# Patient Record
Sex: FEMALE | Race: WHITE | URBAN - METROPOLITAN AREA
[De-identification: names, ages, dates, MRNs, and addresses within clinical notes are randomized per-mention and may not be internally consistent; named-entity substitution may affect disease eponyms.]

---

## 2017-01-23 ENCOUNTER — HISTORICAL (OUTPATIENT)
Dept: LAB | Facility: HOSPITAL | Age: 82
End: 2017-01-23

## 2017-04-25 ENCOUNTER — HISTORICAL (OUTPATIENT)
Dept: LAB | Facility: HOSPITAL | Age: 82
End: 2017-04-25

## 2017-04-27 ENCOUNTER — HISTORICAL (OUTPATIENT)
Dept: MEDSURG UNIT | Facility: HOSPITAL | Age: 82
End: 2017-04-27

## 2017-05-04 ENCOUNTER — HISTORICAL (OUTPATIENT)
Dept: ADMINISTRATIVE | Facility: HOSPITAL | Age: 82
End: 2017-05-04

## 2017-05-04 LAB
INR PPP: 1.95
PROTHROMBIN TIME: 19.8 SECOND(S) (ref 9–11.5)

## 2017-05-11 ENCOUNTER — HISTORICAL (OUTPATIENT)
Dept: ADMINISTRATIVE | Facility: HOSPITAL | Age: 82
End: 2017-05-11

## 2017-05-11 LAB
INR PPP: 2.31
PROTHROMBIN TIME: 23.5 SECOND(S) (ref 9–11.5)

## 2017-05-18 ENCOUNTER — HISTORICAL (OUTPATIENT)
Dept: ADMINISTRATIVE | Facility: HOSPITAL | Age: 82
End: 2017-05-18

## 2017-05-18 LAB
INR PPP: 2.71
PROTHROMBIN TIME: 27.7 SECOND(S) (ref 9–11.5)

## 2017-06-01 ENCOUNTER — HISTORICAL (OUTPATIENT)
Dept: ADMINISTRATIVE | Facility: HOSPITAL | Age: 82
End: 2017-06-01

## 2017-06-01 LAB
INR PPP: 2.57
PROTHROMBIN TIME: 26.2 SECOND(S) (ref 9–11.5)

## 2017-06-08 ENCOUNTER — HISTORICAL (OUTPATIENT)
Dept: ADMINISTRATIVE | Facility: HOSPITAL | Age: 82
End: 2017-06-08

## 2017-06-08 LAB
INR PPP: 2.5
PROTHROMBIN TIME: 25.5 SECOND(S) (ref 9–11.5)

## 2017-06-21 ENCOUNTER — HISTORICAL (OUTPATIENT)
Dept: ADMINISTRATIVE | Facility: HOSPITAL | Age: 82
End: 2017-06-21

## 2017-06-21 LAB
INR PPP: 2.18
PROTHROMBIN TIME: 22.2 SECOND(S) (ref 9–11.5)

## 2017-07-03 ENCOUNTER — HISTORICAL (OUTPATIENT)
Dept: ADMINISTRATIVE | Facility: HOSPITAL | Age: 82
End: 2017-07-03

## 2017-07-03 LAB — DIGOXIN SERPL-MCNC: 0.87 NG/ML (ref 0.9–2)

## 2017-07-19 ENCOUNTER — HISTORICAL (OUTPATIENT)
Dept: ADMINISTRATIVE | Facility: HOSPITAL | Age: 82
End: 2017-07-19

## 2017-07-19 LAB
INR PPP: 2.95
PROTHROMBIN TIME: 30.1 SECOND(S) (ref 9–11.5)

## 2017-08-18 ENCOUNTER — HISTORICAL (OUTPATIENT)
Dept: ADMINISTRATIVE | Facility: HOSPITAL | Age: 82
End: 2017-08-18

## 2017-08-18 LAB
ABS NEUT (OLG): 1.97
ALBUMIN SERPL-MCNC: 3.1 GM/DL (ref 3.4–5)
ALBUMIN/GLOB SERPL: 0.9 RATIO (ref 1.1–2)
ALP SERPL-CCNC: 69 UNIT/L (ref 50–136)
ALT SERPL-CCNC: 30 UNIT/L (ref 12–78)
AST SERPL-CCNC: 35 UNIT/L (ref 10–37)
BASOPHILS # BLD AUTO: 0.01 X10(3)/MCL
BASOPHILS NFR BLD AUTO: 0.3 %
BILIRUB SERPL-MCNC: 0.6 MG/DL (ref 0.2–1)
BILIRUBIN DIRECT+TOT PNL SERPL-MCNC: 0.15 MG/DL (ref 0.05–0.2)
BILIRUBIN DIRECT+TOT PNL SERPL-MCNC: 0.45 MG/DL
BUN SERPL-MCNC: 18 MG/DL (ref 7–18)
CALCIUM SERPL-MCNC: 8.5 MG/DL (ref 8.5–10.1)
CHLORIDE SERPL-SCNC: 105 MMOL/L (ref 98–107)
CHOLEST SERPL-MCNC: 178 MG/DL (ref 50–200)
CHOLEST/HDLC SERPL: 3 {RATIO} (ref 0–5)
CO2 SERPL-SCNC: 30.9 MMOL/L (ref 21–32)
CREAT SERPL-MCNC: 0.66 MG/DL (ref 0.55–1.02)
EOSINOPHIL # BLD AUTO: 0.03 X10(3)/MCL
EOSINOPHIL NFR BLD AUTO: 0.8 %
ERYTHROCYTE [DISTWIDTH] IN BLOOD BY AUTOMATED COUNT: 16 %
GLOBULIN SER-MCNC: 3.4 GM/DL (ref 2.4–3.5)
GLUCOSE SERPL-MCNC: 83 MG/DL (ref 74–106)
HCT VFR BLD AUTO: 41 % (ref 34–46)
HDLC SERPL-MCNC: 59 MG/DL (ref 35–60)
HGB BLD-MCNC: 13.1 GM/DL (ref 11.3–15.4)
IMM GRANULOCYTES # BLD AUTO: 0 10*3/UL (ref 0–0.1)
IMM GRANULOCYTES NFR BLD AUTO: 0 % (ref 0–1)
INR PPP: 2.55
LDLC SERPL CALC-MCNC: 108 MG/DL (ref 50–140)
LYMPHOCYTES # BLD AUTO: 1.4 X10(3)/MCL
LYMPHOCYTES NFR BLD AUTO: 35.2 %
MCH RBC QN AUTO: 30.3 PG (ref 27–33)
MCHC RBC AUTO-ENTMCNC: 32 GM/DL (ref 32–35)
MCV RBC AUTO: 94.7 FL (ref 81–97)
MONOCYTES # BLD AUTO: 0.57 X10(3)/MCL
MONOCYTES NFR BLD AUTO: 14.3 %
NEUTROPHILS # BLD AUTO: 1.97 X10(3)/MCL
NEUTROPHILS NFR BLD AUTO: 49.4 %
PLATELET # BLD AUTO: 115 X10(3)/MCL (ref 151–368)
PMV BLD AUTO: 11 FL
POTASSIUM SERPL-SCNC: 4.5 MMOL/L (ref 3.5–5.1)
PROT SERPL-MCNC: 6.5 GM/DL (ref 6.4–8.2)
PROTHROMBIN TIME: 26 SECOND(S) (ref 9–11.5)
RBC # BLD AUTO: 4.33 X10(6)/MCL (ref 3.9–5)
SODIUM SERPL-SCNC: 142 MMOL/L (ref 136–145)
TRIGL SERPL-MCNC: 53 MG/DL (ref 30–150)
VLDLC SERPL CALC-MCNC: 11 MG/DL
WBC # SPEC AUTO: 3.98 X10(3)/MCL (ref 3.4–9.2)

## 2017-08-22 ENCOUNTER — HISTORICAL (OUTPATIENT)
Dept: ADMINISTRATIVE | Facility: HOSPITAL | Age: 82
End: 2017-08-22

## 2017-08-22 LAB
ABS NEUT (OLG): 2.04
ALBUMIN SERPL-MCNC: 3.3 GM/DL (ref 3.4–5)
ALBUMIN/GLOB SERPL: 0.9 RATIO (ref 1.1–2)
ALP SERPL-CCNC: 72 UNIT/L (ref 50–136)
ALT SERPL-CCNC: 21 UNIT/L (ref 12–78)
AST SERPL-CCNC: 30 UNIT/L (ref 10–37)
BASOPHILS # BLD AUTO: 0.01 X10(3)/MCL
BASOPHILS NFR BLD AUTO: 0.2 %
BILIRUB SERPL-MCNC: 0.6 MG/DL (ref 0.2–1)
BILIRUBIN DIRECT+TOT PNL SERPL-MCNC: 0.2 MG/DL (ref 0.05–0.2)
BILIRUBIN DIRECT+TOT PNL SERPL-MCNC: 0.4 MG/DL
BUN SERPL-MCNC: 22 MG/DL (ref 7–18)
CALCIUM SERPL-MCNC: 8.7 MG/DL (ref 8.5–10.1)
CHLORIDE SERPL-SCNC: 106 MMOL/L (ref 98–107)
CHOLEST SERPL-MCNC: 194 MG/DL (ref 50–200)
CHOLEST/HDLC SERPL: 3 {RATIO} (ref 0–5)
CO2 SERPL-SCNC: 28.9 MMOL/L (ref 21–32)
CREAT SERPL-MCNC: 0.62 MG/DL (ref 0.55–1.02)
DIGOXIN SERPL-MCNC: 1.48 NG/ML (ref 0.9–2)
EOSINOPHIL # BLD AUTO: 0.04 X10(3)/MCL
EOSINOPHIL NFR BLD AUTO: 1 %
ERYTHROCYTE [DISTWIDTH] IN BLOOD BY AUTOMATED COUNT: 16 %
GLOBULIN SER-MCNC: 3.7 GM/DL (ref 2.4–3.5)
GLUCOSE SERPL-MCNC: 80 MG/DL (ref 74–106)
HCT VFR BLD AUTO: 43.9 % (ref 34–46)
HDLC SERPL-MCNC: 65 MG/DL (ref 35–60)
HGB BLD-MCNC: 14 GM/DL (ref 11.3–15.4)
IMM GRANULOCYTES # BLD AUTO: 0.01 10*3/UL (ref 0–0.1)
IMM GRANULOCYTES NFR BLD AUTO: 0.2 % (ref 0–1)
LDLC SERPL CALC-MCNC: 119 MG/DL (ref 50–140)
LYMPHOCYTES # BLD AUTO: 1.47 X10(3)/MCL
LYMPHOCYTES NFR BLD AUTO: 35.9 %
MCH RBC QN AUTO: 30.4 PG (ref 27–33)
MCHC RBC AUTO-ENTMCNC: 31.9 GM/DL (ref 32–35)
MCV RBC AUTO: 95.2 FL (ref 81–97)
MONOCYTES # BLD AUTO: 0.52 X10(3)/MCL
MONOCYTES NFR BLD AUTO: 12.7 %
NEUTROPHILS # BLD AUTO: 2.04 X10(3)/MCL
NEUTROPHILS NFR BLD AUTO: 50 %
PLATELET # BLD AUTO: 136 X10(3)/MCL (ref 151–368)
PMV BLD AUTO: 12 FL
POTASSIUM SERPL-SCNC: 4.3 MMOL/L (ref 3.5–5.1)
PROT SERPL-MCNC: 7 GM/DL (ref 6.4–8.2)
RBC # BLD AUTO: 4.61 X10(6)/MCL (ref 3.9–5)
SODIUM SERPL-SCNC: 143 MMOL/L (ref 136–145)
TRIGL SERPL-MCNC: 50 MG/DL (ref 30–150)
VLDLC SERPL CALC-MCNC: 10 MG/DL
WBC # SPEC AUTO: 4.09 X10(3)/MCL (ref 3.4–9.2)

## 2017-09-14 ENCOUNTER — HISTORICAL (OUTPATIENT)
Dept: ADMINISTRATIVE | Facility: HOSPITAL | Age: 82
End: 2017-09-14

## 2017-09-14 LAB
INR PPP: 2.59
PROTHROMBIN TIME: 26.6 SECOND(S) (ref 9–11.5)

## 2017-09-19 ENCOUNTER — HISTORICAL (OUTPATIENT)
Dept: ADMINISTRATIVE | Facility: HOSPITAL | Age: 82
End: 2017-09-19

## 2017-09-19 LAB — DIGOXIN SERPL-MCNC: 0.71 NG/ML (ref 0.9–2)

## 2017-10-12 ENCOUNTER — HISTORICAL (OUTPATIENT)
Dept: ADMINISTRATIVE | Facility: HOSPITAL | Age: 82
End: 2017-10-12

## 2017-10-12 LAB
INR PPP: 2.2
PROTHROMBIN TIME: 22.6 SECOND(S) (ref 9–11.5)

## 2017-11-21 ENCOUNTER — HISTORICAL (OUTPATIENT)
Dept: ADMINISTRATIVE | Facility: HOSPITAL | Age: 82
End: 2017-11-21

## 2017-11-21 LAB
INR PPP: 3.99
PROTHROMBIN TIME: 40.3 SECOND(S) (ref 9–11.5)

## 2017-12-19 ENCOUNTER — HISTORICAL (OUTPATIENT)
Dept: ADMINISTRATIVE | Facility: HOSPITAL | Age: 82
End: 2017-12-19

## 2017-12-19 LAB
DIGOXIN SERPL-MCNC: 1.09 NG/ML (ref 0.9–2)
INR PPP: 3.81
PROTHROMBIN TIME: 38.5 SECOND(S) (ref 9–11.5)

## 2017-12-22 ENCOUNTER — HISTORICAL (OUTPATIENT)
Dept: ADMINISTRATIVE | Facility: HOSPITAL | Age: 82
End: 2017-12-22

## 2017-12-22 LAB
INR PPP: 2.56
PROTHROMBIN TIME: 25.9 SECOND(S) (ref 9–11.5)

## 2018-01-18 ENCOUNTER — HISTORICAL (OUTPATIENT)
Dept: ADMINISTRATIVE | Facility: HOSPITAL | Age: 83
End: 2018-01-18

## 2018-01-18 LAB
INR PPP: 5.13
PROTHROMBIN TIME: 51.8 SECOND(S) (ref 9–11.5)

## 2018-01-19 ENCOUNTER — HISTORICAL (OUTPATIENT)
Dept: ADMINISTRATIVE | Facility: HOSPITAL | Age: 83
End: 2018-01-19

## 2018-01-19 LAB
INR PPP: 1.81
PROTHROMBIN TIME: 18.3 SECOND(S) (ref 9–11.5)

## 2018-01-22 ENCOUNTER — HISTORICAL (OUTPATIENT)
Dept: ADMINISTRATIVE | Facility: HOSPITAL | Age: 83
End: 2018-01-22

## 2018-01-22 LAB
INR PPP: 1.15
PROTHROMBIN TIME: 11.6 SECOND(S) (ref 9–11.5)

## 2018-01-29 ENCOUNTER — HISTORICAL (OUTPATIENT)
Dept: ADMINISTRATIVE | Facility: HOSPITAL | Age: 83
End: 2018-01-29

## 2018-01-29 LAB
INR PPP: 2.25
PROTHROMBIN TIME: 22.7 SECOND(S) (ref 9–11.5)

## 2018-02-05 ENCOUNTER — HISTORICAL (OUTPATIENT)
Dept: ADMINISTRATIVE | Facility: HOSPITAL | Age: 83
End: 2018-02-05

## 2018-02-05 LAB
INR PPP: 2.37
PROTHROMBIN TIME: 23.9 SECOND(S) (ref 9–11.5)

## 2018-02-19 ENCOUNTER — HISTORICAL (OUTPATIENT)
Dept: ADMINISTRATIVE | Facility: HOSPITAL | Age: 83
End: 2018-02-19

## 2018-02-19 LAB
INR PPP: 2.87
PROTHROMBIN TIME: 29 SECOND(S) (ref 9–11.5)

## 2018-03-05 ENCOUNTER — HISTORICAL (OUTPATIENT)
Dept: ADMINISTRATIVE | Facility: HOSPITAL | Age: 83
End: 2018-03-05

## 2018-03-05 LAB
INR PPP: 3.67
PROTHROMBIN TIME: 37.1 SECOND(S) (ref 9–11.5)

## 2018-03-12 ENCOUNTER — HISTORICAL (OUTPATIENT)
Dept: ADMINISTRATIVE | Facility: HOSPITAL | Age: 83
End: 2018-03-12

## 2018-03-12 LAB
INR PPP: 1.8
PROTHROMBIN TIME: 18.2 SECOND(S) (ref 9–11.5)

## 2018-03-19 ENCOUNTER — HISTORICAL (OUTPATIENT)
Dept: ADMINISTRATIVE | Facility: HOSPITAL | Age: 83
End: 2018-03-19

## 2018-03-19 LAB
INR PPP: 1.37
PROTHROMBIN TIME: 13.8 SECOND(S) (ref 9–11.5)

## 2018-03-20 ENCOUNTER — HISTORICAL (OUTPATIENT)
Dept: ADMINISTRATIVE | Facility: HOSPITAL | Age: 83
End: 2018-03-20

## 2018-03-20 LAB — DIGOXIN SERPL-MCNC: 0.62 NG/ML (ref 0.9–2)

## 2018-03-26 ENCOUNTER — HISTORICAL (OUTPATIENT)
Dept: ADMINISTRATIVE | Facility: HOSPITAL | Age: 83
End: 2018-03-26

## 2018-03-26 LAB
INR PPP: 2.28
PROTHROMBIN TIME: 23 SECOND(S) (ref 9–11.5)

## 2018-04-21 ENCOUNTER — HISTORICAL (OUTPATIENT)
Dept: ADMINISTRATIVE | Facility: HOSPITAL | Age: 83
End: 2018-04-21

## 2018-04-21 LAB
APPEARANCE, UA: CLEAR
BACTERIA SPEC CULT: ABNORMAL
BILIRUB UR QL STRIP: NEGATIVE
COLOR UR: YELLOW
GLUCOSE (UA): NEGATIVE
HGB UR QL STRIP: ABNORMAL
KETONES UR QL STRIP: ABNORMAL
LEUKOCYTE ESTERASE UR QL STRIP: NEGATIVE
NITRITE UR QL STRIP: NEGATIVE
PH UR STRIP: 6 [PH] (ref 4.6–8)
PROT UR QL STRIP: NEGATIVE
RBC #/AREA URNS HPF: ABNORMAL /[HPF]
SP GR UR STRIP: 1.01 (ref 1–1.03)
SQUAMOUS EPITHELIAL, UA: ABNORMAL
UROBILINOGEN UR STRIP-ACNC: 1
WBC #/AREA URNS HPF: ABNORMAL /HPF

## 2018-04-24 ENCOUNTER — HISTORICAL (OUTPATIENT)
Dept: ADMINISTRATIVE | Facility: HOSPITAL | Age: 83
End: 2018-04-24

## 2018-04-24 ENCOUNTER — HOSPITAL ENCOUNTER (OUTPATIENT)
Dept: MEDSURG UNIT | Facility: HOSPITAL | Age: 83
End: 2018-04-26
Attending: FAMILY MEDICINE | Admitting: FAMILY MEDICINE

## 2018-04-24 LAB
FINAL CULTURE: NO GROWTH
INR PPP: 3.8
PROTHROMBIN TIME: 38.6 SECOND(S) (ref 9.3–11.4)

## 2018-04-25 LAB
INR PPP: 3
PROTHROMBIN TIME: 30.6 SECOND(S) (ref 9.3–11.4)

## 2018-04-26 LAB
INR PPP: 1.9
PROTHROMBIN TIME: 19 SECOND(S) (ref 9.3–11.4)

## 2018-05-03 ENCOUNTER — HISTORICAL (OUTPATIENT)
Dept: ADMINISTRATIVE | Facility: HOSPITAL | Age: 83
End: 2018-05-03

## 2018-05-03 LAB
INR PPP: 2.1
PROTHROMBIN TIME: 21.3 SECOND(S) (ref 9.3–11.4)

## 2018-05-07 ENCOUNTER — HISTORICAL (OUTPATIENT)
Dept: RADIOLOGY | Facility: HOSPITAL | Age: 83
End: 2018-05-07

## 2018-05-15 ENCOUNTER — HISTORICAL (OUTPATIENT)
Dept: ADMINISTRATIVE | Facility: HOSPITAL | Age: 83
End: 2018-05-15

## 2018-05-15 LAB
INR PPP: 2.8
PROTHROMBIN TIME: 28.8 SECOND(S) (ref 9.3–11.4)

## 2018-06-04 ENCOUNTER — HISTORICAL (OUTPATIENT)
Dept: ADMINISTRATIVE | Facility: HOSPITAL | Age: 83
End: 2018-06-04

## 2018-06-04 LAB
INR PPP: 2.8
PROTHROMBIN TIME: 28.6 SECOND(S) (ref 9.3–11.4)

## 2018-06-12 ENCOUNTER — HISTORICAL (OUTPATIENT)
Dept: ADMINISTRATIVE | Facility: HOSPITAL | Age: 83
End: 2018-06-12

## 2018-06-12 LAB — DIGOXIN SERPL-MCNC: 1.46 NG/ML (ref 0.9–2)

## 2018-06-15 ENCOUNTER — HISTORICAL (OUTPATIENT)
Dept: ADMINISTRATIVE | Facility: HOSPITAL | Age: 83
End: 2018-06-15

## 2018-06-15 LAB
INR PPP: 2.9
PROTHROMBIN TIME: 29.4 SECOND(S) (ref 9.3–11.4)

## 2018-07-16 ENCOUNTER — HISTORICAL (OUTPATIENT)
Dept: ADMINISTRATIVE | Facility: HOSPITAL | Age: 83
End: 2018-07-16

## 2018-07-16 LAB
INR PPP: 2.5
PROTHROMBIN TIME: 25.2 SECOND(S) (ref 9.3–11.4)

## 2018-07-26 ENCOUNTER — HISTORICAL (OUTPATIENT)
Dept: ADMINISTRATIVE | Facility: HOSPITAL | Age: 83
End: 2018-07-26

## 2018-07-26 LAB — PHOSPHATE SERPL-MCNC: 3.1 MG/DL (ref 2.5–4.9)

## 2018-08-16 ENCOUNTER — HISTORICAL (OUTPATIENT)
Dept: ADMINISTRATIVE | Facility: HOSPITAL | Age: 83
End: 2018-08-16

## 2018-08-16 LAB
INR PPP: 3.1
PROTHROMBIN TIME: 30.9 SECOND(S) (ref 9.3–11.4)

## 2018-08-21 ENCOUNTER — HISTORICAL (OUTPATIENT)
Dept: ADMINISTRATIVE | Facility: HOSPITAL | Age: 83
End: 2018-08-21

## 2018-08-21 LAB
ABS NEUT (OLG): 1.56
ALBUMIN SERPL-MCNC: 3.3 GM/DL (ref 3.4–5)
ALBUMIN/GLOB SERPL: 0.9 RATIO (ref 1.1–2)
ALP SERPL-CCNC: 83 UNIT/L (ref 46–116)
ALT SERPL-CCNC: 21 UNIT/L (ref 12–78)
AST SERPL-CCNC: 28 UNIT/L (ref 10–37)
BILIRUB SERPL-MCNC: 0.7 MG/DL (ref 0.2–1)
BILIRUBIN DIRECT+TOT PNL SERPL-MCNC: 0.19 MG/DL (ref 0–0.2)
BILIRUBIN DIRECT+TOT PNL SERPL-MCNC: 0.51 MG/DL
BUN SERPL-MCNC: 16 MG/DL (ref 7–18)
CALCIUM SERPL-MCNC: 8.9 MG/DL (ref 8.5–10.1)
CHLORIDE SERPL-SCNC: 104 MMOL/L (ref 98–107)
CHOLEST SERPL-MCNC: 199 MG/DL (ref 50–200)
CHOLEST/HDLC SERPL: 3 {RATIO} (ref 0–5)
CO2 SERPL-SCNC: 28 MMOL/L (ref 21–32)
CREAT SERPL-MCNC: 0.55 MG/DL (ref 0.55–1.02)
EOSINOPHIL NFR BLD MANUAL: 1 % (ref 0–8)
ERYTHROCYTE [DISTWIDTH] IN BLOOD BY AUTOMATED COUNT: 16 %
GLOBULIN SER-MCNC: 3.7 GM/DL (ref 2.4–3.5)
GLUCOSE SERPL-MCNC: 69 MG/DL (ref 74–106)
GRANULOCYTES NFR BLD MANUAL: 48 % (ref 47–80)
HCT VFR BLD AUTO: 43.8 % (ref 34–46)
HDLC SERPL-MCNC: 65 MG/DL (ref 35–60)
HGB BLD-MCNC: 14.1 GM/DL (ref 11.3–15.4)
LDLC SERPL CALC-MCNC: 121 MG/DL (ref 50–140)
LYMPHOCYTES NFR BLD MANUAL: 37 % (ref 13–40)
MCH RBC QN AUTO: 30.9 PG (ref 27–33)
MCHC RBC AUTO-ENTMCNC: 32.2 GM/DL (ref 32–35)
MCV RBC AUTO: 95.8 FL (ref 81–97)
MONOCYTES NFR BLD MANUAL: 14 % (ref 2–11)
PLATELET # BLD AUTO: 132 X10(3)/MCL (ref 151–368)
PLATELET # BLD EST: ABNORMAL 10*3/UL
PMV BLD AUTO: 12 FL
POTASSIUM SERPL-SCNC: 4 MMOL/L (ref 3.5–5.1)
PROT SERPL-MCNC: 7 GM/DL (ref 6.4–8.2)
RBC # BLD AUTO: 4.57 X10(6)/MCL (ref 3.9–5)
SODIUM SERPL-SCNC: 139 MMOL/L (ref 136–145)
TRIGL SERPL-MCNC: 63 MG/DL (ref 30–150)
VLDLC SERPL CALC-MCNC: 13 MG/DL
WBC # SPEC AUTO: 3.59 X10(3)/MCL (ref 3.4–9.2)

## 2018-08-29 ENCOUNTER — HISTORICAL (OUTPATIENT)
Dept: ADMINISTRATIVE | Facility: HOSPITAL | Age: 83
End: 2018-08-29

## 2018-08-29 LAB
ABS NEUT (OLG): 2.18
ALBUMIN SERPL-MCNC: 3.5 GM/DL (ref 3.4–5)
ALP SERPL-CCNC: 89 UNIT/L (ref 46–116)
ALT SERPL-CCNC: 24 UNIT/L (ref 12–78)
AST SERPL-CCNC: 31 UNIT/L (ref 10–37)
BASOPHILS # BLD AUTO: 0.01 X10(3)/MCL
BASOPHILS NFR BLD AUTO: 0.2 %
BILIRUB SERPL-MCNC: 0.7 MG/DL (ref 0.2–1)
BILIRUBIN DIRECT+TOT PNL SERPL-MCNC: 0.2 MG/DL (ref 0–0.2)
BILIRUBIN DIRECT+TOT PNL SERPL-MCNC: 0.5 MG/DL
BUN SERPL-MCNC: 14 MG/DL (ref 7–18)
CALCIUM SERPL-MCNC: 8.5 MG/DL (ref 8.5–10.1)
CHLORIDE SERPL-SCNC: 104 MMOL/L (ref 98–107)
CHOLEST SERPL-MCNC: 192 MG/DL (ref 50–200)
CHOLEST/HDLC SERPL: 3 {RATIO} (ref 0–5)
CO2 SERPL-SCNC: 29.7 MMOL/L (ref 21–32)
CREAT SERPL-MCNC: 0.57 MG/DL (ref 0.55–1.02)
CREAT/UREA NIT SERPL: 25
EOSINOPHIL # BLD AUTO: 0 X10(3)/MCL
EOSINOPHIL NFR BLD AUTO: 0 %
ERYTHROCYTE [DISTWIDTH] IN BLOOD BY AUTOMATED COUNT: 16 %
GLUCOSE SERPL-MCNC: 75 MG/DL (ref 74–106)
HCT VFR BLD AUTO: 44.5 % (ref 34–46)
HDLC SERPL-MCNC: 68 MG/DL (ref 35–60)
HGB BLD-MCNC: 14.4 GM/DL (ref 11.3–15.4)
IMM GRANULOCYTES # BLD AUTO: 0 10*3/UL (ref 0–0.1)
IMM GRANULOCYTES NFR BLD AUTO: 0 % (ref 0–1)
INR PPP: 2.9
LDLC SERPL CALC-MCNC: 110 MG/DL (ref 50–140)
LYMPHOCYTES # BLD AUTO: 1.52 X10(3)/MCL
LYMPHOCYTES NFR BLD AUTO: 35.1 %
MCH RBC QN AUTO: 30.9 PG (ref 27–33)
MCHC RBC AUTO-ENTMCNC: 32.4 GM/DL (ref 32–35)
MCV RBC AUTO: 95.5 FL (ref 81–97)
MONOCYTES # BLD AUTO: 0.62 X10(3)/MCL
MONOCYTES NFR BLD AUTO: 14.3 %
NEUTROPHILS # BLD AUTO: 2.18 X10(3)/MCL
NEUTROPHILS NFR BLD AUTO: 50.4 %
PLATELET # BLD AUTO: 129 X10(3)/MCL (ref 151–368)
PMV BLD AUTO: 12 FL
POTASSIUM SERPL-SCNC: 4.1 MMOL/L (ref 3.5–5.1)
PROT SERPL-MCNC: 7.1 GM/DL (ref 6.4–8.2)
PROTHROMBIN TIME: 29.1 SECOND(S) (ref 9.3–11.4)
RBC # BLD AUTO: 4.66 X10(6)/MCL (ref 3.9–5)
SODIUM SERPL-SCNC: 140 MMOL/L (ref 136–145)
TRIGL SERPL-MCNC: 69 MG/DL (ref 30–150)
TSH SERPL-ACNC: 2.63 MIU/ML (ref 0.35–3.75)
VLDLC SERPL CALC-MCNC: 14 MG/DL
WBC # SPEC AUTO: 4.33 X10(3)/MCL (ref 3.4–9.2)

## 2018-09-18 ENCOUNTER — HISTORICAL (OUTPATIENT)
Dept: ADMINISTRATIVE | Facility: HOSPITAL | Age: 83
End: 2018-09-18

## 2018-09-18 LAB
DIGOXIN SERPL-MCNC: 1.2 NG/ML (ref 0.9–2)
INR PPP: 2.5
PROTHROMBIN TIME: 25.8 SECOND(S) (ref 9.3–11.4)

## 2018-10-23 ENCOUNTER — HISTORICAL (OUTPATIENT)
Dept: ADMINISTRATIVE | Facility: HOSPITAL | Age: 83
End: 2018-10-23

## 2018-10-23 LAB
INR PPP: 2.6
PROTHROMBIN TIME: 26.3 SECOND(S) (ref 9.3–11.4)

## 2018-11-20 ENCOUNTER — HISTORICAL (OUTPATIENT)
Dept: ADMINISTRATIVE | Facility: HOSPITAL | Age: 83
End: 2018-11-20

## 2018-11-20 LAB
INR PPP: 3.4
PROTHROMBIN TIME: 30 SECOND(S) (ref 8.6–10.1)

## 2018-11-27 ENCOUNTER — HISTORICAL (OUTPATIENT)
Dept: ADMINISTRATIVE | Facility: HOSPITAL | Age: 83
End: 2018-11-27

## 2018-11-27 LAB
INR PPP: 1.4
PROTHROMBIN TIME: 13 SECOND(S) (ref 8.6–10.1)

## 2018-12-11 ENCOUNTER — HISTORICAL (OUTPATIENT)
Dept: ADMINISTRATIVE | Facility: HOSPITAL | Age: 83
End: 2018-12-11

## 2018-12-11 LAB
DIGOXIN SERPL-MCNC: 0.8 NG/ML (ref 0.9–2)
INR PPP: 2.5
PROTHROMBIN TIME: 22.9 SECOND(S) (ref 8.6–10.1)

## 2019-01-15 ENCOUNTER — HISTORICAL (OUTPATIENT)
Dept: ADMINISTRATIVE | Facility: HOSPITAL | Age: 84
End: 2019-01-15

## 2019-01-15 LAB
INR PPP: 3.2
PROTHROMBIN TIME: 28.9 SECOND(S) (ref 8.6–10.1)

## 2019-02-01 ENCOUNTER — HISTORICAL (OUTPATIENT)
Dept: RADIOLOGY | Facility: HOSPITAL | Age: 84
End: 2019-02-01

## 2019-02-19 ENCOUNTER — HISTORICAL (OUTPATIENT)
Dept: ADMINISTRATIVE | Facility: HOSPITAL | Age: 84
End: 2019-02-19

## 2019-02-19 LAB
INR PPP: 5.6
PROTHROMBIN TIME: 48.8 SECOND(S) (ref 8.6–10.1)

## 2019-02-21 ENCOUNTER — HISTORICAL (OUTPATIENT)
Dept: ADMINISTRATIVE | Facility: HOSPITAL | Age: 84
End: 2019-02-21

## 2019-02-21 LAB
INR PPP: 3.1
PROTHROMBIN TIME: 28.1 SECOND(S) (ref 8.6–10.1)

## 2019-02-28 ENCOUNTER — HISTORICAL (OUTPATIENT)
Dept: ADMINISTRATIVE | Facility: HOSPITAL | Age: 84
End: 2019-02-28

## 2019-02-28 LAB
INR PPP: 1.8
PROTHROMBIN TIME: 16.7 SECOND(S) (ref 8.6–10.1)

## 2019-03-19 ENCOUNTER — HISTORICAL (OUTPATIENT)
Dept: ADMINISTRATIVE | Facility: HOSPITAL | Age: 84
End: 2019-03-19

## 2019-03-19 LAB
DIGOXIN SERPL-MCNC: 1.02 NG/ML (ref 0.9–2)
INR PPP: 1.5
PROTHROMBIN TIME: 13.8 SECOND(S) (ref 8.6–10.1)

## 2019-03-26 ENCOUNTER — HISTORICAL (OUTPATIENT)
Dept: ADMINISTRATIVE | Facility: HOSPITAL | Age: 84
End: 2019-03-26

## 2019-03-26 LAB
INR PPP: 3
PROTHROMBIN TIME: 27.1 SECOND(S) (ref 8.6–10.1)

## 2019-04-17 ENCOUNTER — HISTORICAL (OUTPATIENT)
Dept: ADMINISTRATIVE | Facility: HOSPITAL | Age: 84
End: 2019-04-17

## 2019-04-17 LAB
INR PPP: 5.6
PROTHROMBIN TIME: 48.9 SECOND(S) (ref 8.6–10.1)

## 2019-04-19 ENCOUNTER — HISTORICAL (OUTPATIENT)
Dept: ADMINISTRATIVE | Facility: HOSPITAL | Age: 84
End: 2019-04-19

## 2019-04-19 LAB
INR PPP: 2.3
PROTHROMBIN TIME: 21.1 SECOND(S) (ref 8.6–10.1)

## 2019-04-26 ENCOUNTER — HISTORICAL (OUTPATIENT)
Dept: ADMINISTRATIVE | Facility: HOSPITAL | Age: 84
End: 2019-04-26

## 2019-04-26 LAB
INR PPP: 1.5
PROTHROMBIN TIME: 13.8 SECOND(S) (ref 8.6–10.1)

## 2019-05-03 ENCOUNTER — HISTORICAL (OUTPATIENT)
Dept: ADMINISTRATIVE | Facility: HOSPITAL | Age: 84
End: 2019-05-03

## 2019-05-03 LAB
INR PPP: 1.5
PROTHROMBIN TIME: 13.7 SECOND(S) (ref 8.6–10.1)

## 2019-05-10 ENCOUNTER — HISTORICAL (OUTPATIENT)
Dept: ADMINISTRATIVE | Facility: HOSPITAL | Age: 84
End: 2019-05-10

## 2019-05-10 LAB
INR PPP: 1.4
PROTHROMBIN TIME: 13.3 SECOND(S) (ref 8.6–10.1)

## 2019-05-14 ENCOUNTER — HISTORICAL (OUTPATIENT)
Dept: ADMINISTRATIVE | Facility: HOSPITAL | Age: 84
End: 2019-05-14

## 2019-05-14 LAB
INR PPP: 1.5
PROTHROMBIN TIME: 13.6 SECOND(S) (ref 8.6–10.1)

## 2022-04-10 ENCOUNTER — HISTORICAL (OUTPATIENT)
Dept: ADMINISTRATIVE | Facility: HOSPITAL | Age: 87
End: 2022-04-10

## 2022-04-28 VITALS
HEIGHT: 59 IN | WEIGHT: 125.25 LBS | SYSTOLIC BLOOD PRESSURE: 131 MMHG | DIASTOLIC BLOOD PRESSURE: 63 MMHG | BODY MASS INDEX: 25.25 KG/M2

## 2022-04-30 NOTE — OP NOTE
DATE OF SURGERY:    04/27/2017    SURGEON:  Gavin Glasgow MD    PREOPERATIVE DIAGNOSIS:    1. Malignant skin lesion of the right cheek. '.  2. Actinic keratosis of the left cheek.    POSTOPERATIVE DIAGNOSIS:    3. Malignant skin lesion of the right cheek. '.  4. Actinic keratosis of the left cheek.    OPERATION PERFORMED:    5. Wide excision of the malignant skin lesion of the right cheek and layered closure of the wound.    6. Excision of actinic keratosis of the left cheek and layered closure of the wound.    ANESTHESIA:  Local MAC.    PROCEDURE IN DETAIL:  This 89-year-old female patient was brought to the operating room, placed in supine position.  IV sedation was given.  Face and neck were prepared and draped in the usual fashion.  Initially, the right cheek lesion was excised.  1% Xylocaine anesthesia was infiltrated. Subsequently elliptical incision of the lesion measuring 2 x 1.5 cm was excised.  Following excision the hemostasis was obtained.  The wound excision was deepened all the way to the subcutaneous plane.  The part of the subcutaneous tissue was also excised.  Extreme care was taken not to injure the facial nerve branches.  The wound was closed in layers.  The subcutaneous plane approximated with 2-0 chromic catgut interrupted sutures.  Skin approximated with 5-0 nylon interrupted sutures.  Layered closure wound measured 4 cm.  The excision diameter on the 1st wound was 2.4 cm.   '   After completion of the closure of the wound on the right cheek the left cheek lesion was excised.  Again this lesion measured 4 x 3 cm in size.  After infiltrating 1% Xylocaine anesthesia by elliptical incision this lesion was again excised.   The lesion measured 4 x 3 cm in size.  Excision included full-thickness skin and part of the subcutaneous plane.  Extreme care was taken not to injure the branches of the facial nerve.  Following this, the wound closed in layers.  The deeper layers closed by 3-0 chromic gut  interrupted sutures.  Skin approximated with 5-0 interrupted suture.  Layered closure wound measured 6 cm.    Second lesion the excision diameter was 4.6 cm.  After cleaning the area a dressing applied. She tolerated the     procedure well.  She was transferred to her room in good condition.  She received a gram of Ancef prior to the procedure.        ______________________________  MD MORELIA Matute/KIMBERLY  DD:  04/27/2017  Time:  07:17PM  DT:  04/27/2017  Time:  07:57PM  Job #:  [id number]    cc: Jeb Moore MD

## 2022-04-30 NOTE — H&P
Patient:   Kinsey Hathaway            MRN: [id number]            FIN: [id number]               Age:   89 years     Sex:  Female     :  1924   Associated Diagnoses:   Weakness; A-fib; Hypertension; CVA (cerebrovascular accident)   Author:   Oscar TORREZ, Jeb Urbina      Basic Information   Source of history:  Self.    Referral source:  Emergency department.    History limitation:  Clinical condition.       Chief Complaint   2018 9:40 CDT       left sided facial drooping started yesterday not improving today        History of Present Illness             The patient presents with Left-sided facial droop.  This is a 89-year-old white female who resides at the [facility].  Patient noted by nursing staff to have left-sided facial droop that have been present for 24 hours.  Patient taken to the ER for further evaluation.  Patient will be admitted for further treatment.        Review of Systems   Constitutional:  Weakness.    Respiratory:  Negative.    Cardiovascular:  Negative.    Gastrointestinal:  Negative.    Genitourinary:  Negative.    Musculoskeletal:       Muscle weakness: General.    Neurologic:  Confusion.    Psychiatric:  Negative.       Health Status   Allergies:    Allergic Reactions (Selected)  No Known Allergies,    Allergies (1) Active Reaction  No Known Allergies None Documented     Current medications:  (Selected)   Inpatient Medications  Ordered  Bystolic 5 mg oral tablet: 10 mg, form: Tab, Oral, Daily, first dose 18 9:00:00 CDT  MiraLax (polyethylene glycol 3350): 17 gm, form: Powder-Recon, Oral, Daily, first dose 18 9:00:00 CDT  acetaminophen: 1,000 mg, form: Tab, Oral, q4hr PRN for pain, first dose 18 18:32:00 CDT  acetaminophen: 650 mg, form: Tab, Oral, q6hr PRN for fever, first dose 18 12:29:00 CDT  cyproheptadine 4 mg oral tablet: 4 mg, form: Tab, Oral, TID, first dose 18 21:00:00 CDT  digoxin 125 mcg (0.125 mg) oral tablet: 125 mcg, form:  Tab, Oral, Daily, first dose 04/25/18 9:00:00 CDT  furosemide 20 mg oral tablet: 20 mg, form: Tab, Oral, Daily, first dose 04/25/18 9:00:00 CDT  lisinopril 20 mg oral tablet: 20 mg, form: Tab, Oral, Daily, first dose 04/25/18 9:00:00 CDT  pravastatin: 40 mg, form: Tab, Oral, Daily, first dose 04/25/18 9:00:00 CDT  warfarin: 2 mg, form: Tab, Oral, Daily, first dose 04/25/18 9:00:00 CDT  Documented Medications  Documented  Benadryl 25 mg oral tablet: 25 mg = 1 tab(s), Oral, q4hr, PRN PRN itching, 0 Refill(s)  Bystolic 10 mg oral tablet: 10 mg = 1 tab(s), Oral, Daily, # 30 tab(s), 0 Refill(s)  Bystolic 5 mg oral tablet: 10 mg = 2 tab(s), Oral, Daily, 0 Refill(s)  DIGOXIN 125 MCG TABS: 125 mcg = 1 tab(s), Oral, Daily  DIGOXIN 250 MCG TABS:   LISINOPRIL 20 MG TABS: 20 mg = 1 tab(s), Oral, Daily  Milk of Magnesia 24% oral concentrate: 7.2 gm = 30 mL, Oral, q12hr, PRN PRN constipation, 0 Refill(s)  POLYETH GLYC POW 3350 NF: 17 gm, Oral, Daily  TRAMADOL HCL 50 MG TABS: 50 mg = 1 tab(s), Oral, q6hr, PRN PRN pain  WARFARIN SODIUM 1 MG TABS: 1 mg = 1 tab(s), Oral, Daily  acetaminophen 500 mg oral tablet: 1,000 mg = 2 tab(s), Oral, q4hr, PRN PRN for pain, # 120 tab(s), 0 Refill(s)  bisacodyl 5 mg ORAL enteric coated tablet: 10 mg = 2 tab(s), Oral, q12hr, PRN PRN for constipation, # 20 tab(s), 0 Refill(s)  cyproheptadine 4 mg oral tablet: 4 mg = 1 tab(s), Oral, TID, # 90 tab(s), 0 Refill(s)  furosemide 20 mg oral tablet: 20 mg = 1 tab(s), Oral, Daily, # 30 tab(s), 0 Refill(s)  lisinopril 20 mg oral tablet: 20 mg = 1 tab(s), Oral, Daily, 0 Refill(s)  potassium chloride 20 mEq oral TABLET extended release: 20 mEq = 1 tab(s), Oral, BID, # 60 tab(s), 0 Refill(s)  pravastatin 40 mg oral tablet: 40 mg = 1 tab(s), Oral, Daily, # 30 tab(s), 0 Refill(s)  senna 8.6 mg oral tablet: 17.2 mg = 2 tab(s), Oral, Once a day (at bedtime), # 20 tab(s), 0 Refill(s)  warfarin 2 mg oral tablet: 2 mg = 1 tab(s), Oral, Daily, hold med if INR >3.0, # 30  tab(s), 0 Refill(s),    Medications (10) Active  Scheduled: (8)  cyproheptadine 4 mg Tab UD  4 mg 1 tab(s), Oral, TID  digoxin 0.125 mg Tab UD  125 mcg 1 tab(s), Oral, Daily  furosemide 20 mg tab UD  20 mg 1 tab(s), Oral, Daily  lisinopril 20 mg Tab UD ([facility])  20 mg 1 tab(s), Oral, Daily  nebivolol 5 mg Tab  10 mg 2 tab(s), Oral, Daily  polyethylene glycol (Miralax 17 gm pkt)  17 gm 1 packet(s), Oral, Daily  pravastatin 20 mg Tab UD  40 mg 2 tab(s), Oral, Daily  warfarin 2 mg Tab  2 mg 1 tab(s), Oral, Daily  Continuous: (0)  PRN: (2)  acetaminophen 325 mg Tab  650 mg 2 tab(s), Oral, q6hr  acetaminophen 500 mg Tab  1,000 mg 2 tab(s), Oral, q4hr     Problem list:    All Problems  Osteoarthritis / SNOMED CT 0261504944 / Confirmed,    Active Problems (1)  Osteoarthritis         Histories   Past Medical History:    Resolved  ATRIAL FIBRILLATION (427.31):  Resolved.   Family History:    No family history items have been selected or recorded.   Procedure history:    Excision Lesion Facial (Face) performed by Gavin Glasgow MD on 4/27/2017 at 89 Years.  Comments:  4/27/2017 09:45 - Jacinto CASSIDY, Adrianna Barreto  auto-populated from documented surgical case  right foot surgery.  cataract surgery.  pacemaker insertion.   Social History        Social & Psychosocial Habits    Alcohol  12/08/2016  Use: Never    Employment/School  12/27/2017  Status: Retired    Exercise    Comment: DOES NOT EXERCISE - 02/06/2018 14:49 - Digna Martinez LPN    Home/Environment  12/27/2017  Lives with: NURSING HOME    Home equipment: Walker/Cane    Nutrition/Health  02/06/2018  Type of diet: Regular    Sexual  02/06/2018  Sexually active: No    Substance Abuse  12/08/2016  Use: Never    Tobacco  12/08/2016  Use: Never smoker  .        Physical Examination      Vital Signs (last 24 hrs)_____  Last Charted___________  Temp Oral     36.4 DegC  (APR 24 16:00)  Heart Rate Peripheral   61 bpm  (APR 24 16:00)  Resp Rate         20 br/min  (APR 24  16:00)  SBP      H 194mmHg  (APR 24 13:57)  DBP      71 mmHg  (APR 24 13:57)  SpO2      94 %  (APR 24 16:00)  Weight      52.3 kg  (APR 24 13:57)  Height      172 cm  (APR 24 13:57)  BMI      17.68  (APR 24 13:57)     Measurements from flowsheet : Measurements   4/24/2018 13:57 CDT      Weight Dosing             52.3 kg                             Weight Measured           52.3 kg                             Weight Measured and Calculated in Lbs     115.30 lb                             Height/Length Dosing      172 cm                             Height/Length Measured    172 cm                             BSA Measured              1.58 m2                             Body Mass Index Measured  17.68 kg/m2                             Ideal Body Weight Calculated              63 kg    4/24/2018 12:04 CDT      Weight Dosing             Not Done: Task Duplication  (Not Done)                            Height/Length Dosing      Not Done: Task Duplication  (Not Done)   4/24/2018 12:00 CDT      Weight Measured           52.3 kg                             Weighing Method           Bed                             Height/Length Measured    172 cm    4/24/2018 9:40 CDT       Weight Dosing             50.0 kg                             Weight Measured and Calculated in Lbs     110.23 lb                             Weight Estimated          50.0 kg                             Height/Length Dosing      149.86 cm                             Height/Length Estimated   149.86 cm                             Body Mass Index Estimated 22.26 kg/m2     Intake and Output   Fluid Balance Primitives   1/10/2017 12:00 CST      Oral Intake               420 mL                             Diaper Count              2    1/10/2017 8:00 CST       Oral Intake               360 mL        General:  No acute distress.         Appearance: Calm.         Skin: No cyanosis, No clubbing, No edema.    Eye:  Pupils are equal, round and reactive to light,  Extraocular movements are intact.    HENT:       Mouth: Dentures ( Upper and lower dentures ).    Respiratory:  Lungs are clear to auscultation, Respirations are non-labored, Breath sounds are equal.    Cardiovascular:  Normal rate, Regular rhythm, No murmur.    Gastrointestinal:  Soft, Non-tender, Non-distended, Normal bowel sounds.    Musculoskeletal:  Moving all extremities.    Integumentary:  Warm.    Neurologic:  Alert.         Orientation: To person, Not to place, Not to time, Not to situation.         Cranial nerves: VII: Facial ( Left, Diminished ).    Psychiatric:  Cooperative.       Impression and Plan   Diagnosis     Weakness (RBT38-UM R53.1).     A-fib (WKW19-KZ I48.91).     Hypertension (GYQ13-RA I10).     CVA (cerebrovascular accident) (OUJ82-IO I63.9).     Course:    1.  CVA  Consult speech therapy    2.  Hypertension  Continue current medication  Monitor BP    3.  A. fib  Continue Coumadin  Monitor daily PT/INR    4.  Weakness  Consult PT OT.

## 2022-04-30 NOTE — DISCHARGE SUMMARY
Pt discharged to home. discharge instructions given to nursing home. Report called to Audrey at [facility]. Pt's condition stable.

## 2022-04-30 NOTE — H&P
SOCIAL SECURITY #:      :  1924    CHIEF COMPLAINT:  Nonhealing skin lesions of the face.    HISTORY OF PRESENT ILLNESS:  This 89-year-old female, a resident of [facility] was referred to me by Dr. Jeb Moore for surgical evaluation of skin lesions of the face, mostly on the right cheek.  The patient has an ulcerated lesion in that area since the last several months that have not shown any sign of healing.  She has a diffuse lesion on the left side of the cheek also.  The patient is hypertensive.  She had a pacemaker insertion done in the past and she is on Coumadin.   Patient has had atrial fibrillation also and that is the reason she is on Coumadin.    MEDICATIONS:  She is on multiple other medications includin. Bystolic.  2. Digoxin.  3. Lisinopril.  4. Aspirin.  5. Cyproheptadine.  6. Lasix.  7. Potassium chloride.  8. Pravastatin.  9. Coumadin.  10. Analgesics, etc.    PAST MEDICAL HISTORY:  She gives a history of surgical procedures including pacemaker insertion, cataract extraction, foot surgery.    SOCIAL HISTORY:  She never smoked.  No history of alcohol abuse.  No history of any illicit drug use.    FAMILY HISTORY:  No family history of diabetes mellitus.    REVIEW OF SYSTEMS:  RESPIRATORY:  Occasional shortness of breath.  CARDIOVASCULAR:  Known hypertensive, known atrial fibrillation.  She has had pacemaker.  NEUROLOGIC:  No seizures.  GASTROINTESTINAL:  No abdominal pain.  ENDOCRINE:  No diabetes mellitus.  NEUROLOGIC:  No seizures.  GASTROINTESTINAL:  No abdominal pain.  HEMATOLOGIC:   Bleeds easily because of Coumadin.  GENITOURINARY:  No complaints.  MUSCULOSKELETAL:  Patient has joint pains.  PSYCHIATRIC:  No complaints.  GENERAL:  Patient has skin lesions of the face suspicious for malignancy.  Other systems no complaints.    PHYSICAL EXAMINATION:  GENERAL:  Condition of the patient is satisfactory.   VITAL SIGNS:  Stable.   HEENT:  No scalp lesion.  Oral cavity and  throat normal.  The patient has an ulcerated lesion on the right cheek and also a diffuse large skin lesion on the left cheek.     NECK:  No cervical lymphadenopathy.  Thyroid, no nodules.  Trachea central.  No jugular venous distention or carotid bruits.   No supraclavicular lymphadenopathy.   CHEST:  Lungs clear.  No rales audible, wheezing.     HEART:  Regular, no murmur.   BREASTS:  No lumps.   AXILLARY:  No masses or nodules.     HEART:  Occasionally irregular,  pacemaker noted on the left anterior chest wall.       ABDOMEN:  Soft.  No tenderness.  No masses palpable.  No costovertebral angle tenderness.  Bowel sounds heard.   EXTREMITIES:  No pedal edema.  Femoral and popliteal pulses normal, pedal pulses feeble.  Upper extremities normal.     SPINE:  Normal.   NEUROLOGIC:   Normal.    IMPRESSION:  Malignant skin lesions of face.      PLAN:  The patient is scheduled for a wide excision of the malignant skin lesion of the right cheek and excision and possible shave excision and cauterization of the lesion on the left cheek.        ______________________________  MD MORELIA Matute/MAEGAN  DD:  04/27/2017  Time:  04:47AM  DT:  04/27/2017  Time:  05:17AM  Job #:  [id number]    The H&P was reviewed, the patient was examined, and the following changes to the patients   condition are noted:  _____________________________________________________________________________  ______________________________________________________________________________  ______________________________________________________________________________  [  ]  No changes to the patient's condition:      ______________________________                                             ___________________  PHYSICIAN SIGNATURE                                                             DATE/TIME    cc: Jeb Moore MD

## 2022-04-30 NOTE — DISCHARGE SUMMARY
Patient:   Kinsey Hathaway            MRN: [id number]            FIN: [id number]               Age:   89 years     Sex:  Female     :  1924   Associated Diagnoses:   None   Author:   Oscar TORREZ, Jeb Urbina      Discharge Information      Discharge Summary Information   ADMIT/DISCHARGE DATE   Admit Date: 2018  Discharge Date: 2018     Physicians   Attending Physician - Oscar TORREZ, Jeb Urbina  Admitting Physician - Oscar TORREZ, Jeb Urbina  Consulting Physician - Oscar TORREZ, Jeb Urbina  Primary Care Physician - Oscar TORREZ, Jeb Urbina     Discharge Diagnosis   Cerebral infarction, unspecified (I63.9)   Essential (primary) hypertension (I10)   Weakness (R53.1)   Unspecified atrial fibrillation (I48.91)      Procedures   No procedures recorded for this visit.   Immunizations   No immunizations recorded for this visit.     Discharge Medications   Discharge Med Rec is not complete      Hospital Course   Hospital Course   This is a 89-year-old white female who resides at the [facility].  Patient was noted to have some left-sided facial droop.  Facial droop had been present for 24 hours.  Patient taken to the ER for further evaluation.  Patient was admitted for observation.  She had a normal course of recovery during this admit.  Today 18, patient has returned back to baseline.  Patient will be discharged back to [facility]..        Physical Examination      Vital Signs (last 24 hrs)_____  Last Charted___________  Temp Oral     36.5 DegC  ( 12:00)  Heart Rate Apical    62 bpm  ( 12:00)  Resp Rate         20 br/min  ( 12:00)  SBP      H 164mmHg  ( 12:00)  DBP      80 mmHg  ( 12:00)  SpO2      97 %  ( 12:00)     Intake and Output   Fluid Balance Primitives   2018 23:00 CDT      Oral Intake               60 mL                             Urine Count               4                             Stool Count               0     4/25/2018 15:00 CDT      Oral Intake               420 mL                             Urine Count               3                             Stool Count               0    4/25/2018 7:00 CDT       Oral Intake               360 mL                             Urine Count               2                             Stool Count               0        General:  Alert and oriented, No acute distress.    Respiratory:  Lungs are clear to auscultation, Respirations are non-labored, Breath sounds are equal.    Cardiovascular:  Normal rate, Regular rhythm, No murmur.    Gastrointestinal:  Soft, Non-tender, Non-distended, Normal bowel sounds.    Integumentary:  Warm.    Neurologic:  Alert, Cranial Nerves II-XII are grossly intact.    Psychiatric:  Cooperative.       Discharge Plan   Discharge Summary Plan   Discharge disposition: discharge to skilled nursing facility [facility].     Prescriptions: continue same medications.